# Patient Record
Sex: FEMALE | URBAN - METROPOLITAN AREA
[De-identification: names, ages, dates, MRNs, and addresses within clinical notes are randomized per-mention and may not be internally consistent; named-entity substitution may affect disease eponyms.]

---

## 2018-01-05 ENCOUNTER — APPOINTMENT (OUTPATIENT)
Age: 53
Setting detail: DERMATOLOGY
End: 2018-01-05

## 2018-01-05 DIAGNOSIS — Z41.9 ENCOUNTER FOR PROCEDURE FOR PURPOSES OTHER THAN REMEDYING HEALTH STATE, UNSPECIFIED: ICD-10-CM

## 2018-01-05 PROCEDURE — OTHER DERMAPEN: OTHER

## 2018-01-05 PROCEDURE — OTHER OTHER (COSMETIC): OTHER

## 2018-01-05 ASSESSMENT — LOCATION SIMPLE DESCRIPTION DERM
LOCATION SIMPLE: RIGHT FOREHEAD
LOCATION SIMPLE: RIGHT CHEEK
LOCATION SIMPLE: LEFT CHEEK

## 2018-01-05 ASSESSMENT — LOCATION ZONE DERM: LOCATION ZONE: FACE

## 2018-01-05 ASSESSMENT — LOCATION DETAILED DESCRIPTION DERM
LOCATION DETAILED: RIGHT MEDIAL FOREHEAD
LOCATION DETAILED: RIGHT INFERIOR CENTRAL MALAR CHEEK
LOCATION DETAILED: LEFT INFERIOR CENTRAL MALAR CHEEK

## 2018-01-05 NOTE — PROCEDURE: DERMAPEN
Post-Care Instructions: After the procedure, take precautions agains sun exposure. Do not apply sunscreen for 12 hours after the procedure. Do not apply make-up for 12 hours after the procedure. Avoid alcohol based toners for 10-14 days. After 2-3 days patients can return to their regular skin regimen.
Treatment Number (Optional): 1
Location #2: cheeks
Consent: Written consent obtained, risks reviewed including but not limited to pain, scarring, infection and incomplete improvement.  Patient understands the procedure is cosmetic in nature and will require out of pocket payment.
Pre-Procedure Text: After consent was obtained the treatment areas were treated using the above parameters.
Depth In Mm: 2
Depth In Mm: 1.5
Location #3: chin/lip
Location #1: forehead
Detail Level: Zone
Depth In Mm: 0.5
Serum (Optional): Hyaluronic Acid

## 2018-01-05 NOTE — PROCEDURE: OTHER (COSMETIC)
Other (Free Text): Microneedling with PRP. well tolerated. spf 50 applied post. Pt is sharing series with .
Detail Level: Zone

## 2018-08-03 ENCOUNTER — APPOINTMENT (OUTPATIENT)
Age: 53
Setting detail: DERMATOLOGY
End: 2018-08-06

## 2018-08-03 DIAGNOSIS — Z41.9 ENCOUNTER FOR PROCEDURE FOR PURPOSES OTHER THAN REMEDYING HEALTH STATE, UNSPECIFIED: ICD-10-CM

## 2018-08-03 PROCEDURE — OTHER OTHER (COSMETIC): OTHER

## 2018-08-03 PROCEDURE — OTHER DERMAPEN: OTHER

## 2018-08-03 ASSESSMENT — LOCATION SIMPLE DESCRIPTION DERM
LOCATION SIMPLE: RIGHT CHEEK
LOCATION SIMPLE: LEFT CHEEK
LOCATION SIMPLE: LEFT FOREHEAD

## 2018-08-03 ASSESSMENT — LOCATION DETAILED DESCRIPTION DERM
LOCATION DETAILED: LEFT SUPERIOR CENTRAL BUCCAL CHEEK
LOCATION DETAILED: RIGHT SUPERIOR MEDIAL BUCCAL CHEEK
LOCATION DETAILED: LEFT MEDIAL FOREHEAD

## 2018-08-03 ASSESSMENT — LOCATION ZONE DERM: LOCATION ZONE: FACE

## 2018-08-03 NOTE — PROCEDURE: DERMAPEN
Depth In Mm: 0.5
Depth In Mm: 1.2
Location #2: cheeks
Post-Care Instructions: After the procedure, take precautions agains sun exposure. Do not apply sunscreen for 12 hours after the procedure. Do not apply make-up for 12 hours after the procedure. Avoid alcohol based toners for 10-14 days. After 2-3 days patients can return to their regular skin regimen.
Depth In Mm: 1
Serum (Optional): Hyaluronic Acid
Treatment Number (Optional): 2
Detail Level: Zone
Pre-Procedure Text: After consent was obtained the treatment areas were treated using the above parameters.
Location #3: chin
Location #4: lip
Location #1: forehead
Consent: Written consent obtained, risks reviewed including but not limited to pain, scarring, infection and incomplete improvement.  Patient understands the procedure is cosmetic in nature and will require out of pocket payment.

## 2018-08-03 NOTE — PROCEDURE: OTHER (COSMETIC)
Detail Level: Zone
Other (Free Text): second microneedling with PRP. well tolerated with 30 min of numbing. smart tone put on after, LM